# Patient Record
Sex: MALE | Race: WHITE | NOT HISPANIC OR LATINO | Employment: FULL TIME | URBAN - NONMETROPOLITAN AREA
[De-identification: names, ages, dates, MRNs, and addresses within clinical notes are randomized per-mention and may not be internally consistent; named-entity substitution may affect disease eponyms.]

---

## 2017-02-01 ENCOUNTER — OFFICE VISIT (OUTPATIENT)
Dept: PAIN MEDICINE | Facility: CLINIC | Age: 37
End: 2017-02-01

## 2017-02-01 ENCOUNTER — LAB (OUTPATIENT)
Dept: LAB | Facility: HOSPITAL | Age: 37
End: 2017-02-01

## 2017-02-01 VITALS
HEIGHT: 69 IN | DIASTOLIC BLOOD PRESSURE: 74 MMHG | WEIGHT: 315 LBS | BODY MASS INDEX: 46.65 KG/M2 | SYSTOLIC BLOOD PRESSURE: 122 MMHG

## 2017-02-01 DIAGNOSIS — Z79.899 HIGH RISK MEDICATIONS (NOT ANTICOAGULANTS) LONG-TERM USE: ICD-10-CM

## 2017-02-01 DIAGNOSIS — E66.01 MORBID OBESITY DUE TO EXCESS CALORIES (HCC): ICD-10-CM

## 2017-02-01 DIAGNOSIS — M47.817 LUMBOSACRAL SPONDYLOSIS WITHOUT MYELOPATHY: Primary | ICD-10-CM

## 2017-02-01 DIAGNOSIS — Z79.899 ENCOUNTER FOR LONG-TERM (CURRENT) USE OF HIGH-RISK MEDICATION: ICD-10-CM

## 2017-02-01 PROCEDURE — 82570 ASSAY OF URINE CREATININE: CPT | Performed by: PAIN MEDICINE

## 2017-02-01 PROCEDURE — 80307 DRUG TEST PRSMV CHEM ANLYZR: CPT | Performed by: PAIN MEDICINE

## 2017-02-01 PROCEDURE — G0481 DRUG TEST DEF 8-14 CLASSES: HCPCS | Performed by: PAIN MEDICINE

## 2017-02-01 PROCEDURE — 99214 OFFICE O/P EST MOD 30 MIN: CPT | Performed by: PAIN MEDICINE

## 2017-02-01 RX ORDER — FUROSEMIDE 40 MG/1
40 TABLET ORAL
COMMUNITY
Start: 2016-07-26 | End: 2019-01-30 | Stop reason: SDUPTHER

## 2017-02-01 RX ORDER — CYCLOBENZAPRINE HCL 10 MG
10 TABLET ORAL 3 TIMES DAILY
COMMUNITY
Start: 2016-07-26 | End: 2019-01-30 | Stop reason: SDUPTHER

## 2017-02-01 RX ORDER — LANCETS 30 GAUGE
1 EACH MISCELLANEOUS
COMMUNITY
Start: 2016-04-26 | End: 2019-01-30 | Stop reason: SDUPTHER

## 2017-02-01 RX ORDER — BLOOD-GLUCOSE METER
KIT MISCELLANEOUS
COMMUNITY
Start: 2016-12-31 | End: 2019-01-30 | Stop reason: SDUPTHER

## 2017-02-01 RX ORDER — GABAPENTIN 300 MG/1
300 CAPSULE ORAL
COMMUNITY
Start: 2016-11-30 | End: 2017-09-11 | Stop reason: DRUGHIGH

## 2017-02-01 RX ORDER — GLYBURIDE 5 MG/1
5 TABLET ORAL
COMMUNITY
Start: 2016-01-13 | End: 2019-01-30 | Stop reason: SDUPTHER

## 2017-02-01 RX ORDER — LISINOPRIL 20 MG/1
20 TABLET ORAL
COMMUNITY
Start: 2017-01-30 | End: 2019-01-30 | Stop reason: SDUPTHER

## 2017-02-01 RX ORDER — HYDROCODONE BITARTRATE AND ACETAMINOPHEN 7.5; 325 MG/1; MG/1
TABLET ORAL
COMMUNITY
Start: 2017-01-07 | End: 2019-01-30 | Stop reason: SDUPTHER

## 2017-02-01 RX ORDER — HYDROCODONE BITARTRATE AND ACETAMINOPHEN 7.5; 325 MG/1; MG/1
1 TABLET ORAL 3 TIMES DAILY
Qty: 90 TABLET | Refills: 0 | Status: SHIPPED | OUTPATIENT
Start: 2017-02-01 | End: 2017-03-03

## 2017-02-01 RX ORDER — LANCETS 30 GAUGE
EACH MISCELLANEOUS
COMMUNITY
Start: 2016-12-31 | End: 2019-01-30 | Stop reason: SDUPTHER

## 2017-02-01 NOTE — PROGRESS NOTES
Peter Abbott is a 36 y.o. male.   1980    HPI:   Location: lower back,left thigh, and left buttock  Quality: aching, sharp and left leg numbness  Severity: 6/10  Timing: constant  Alleviating: positioning  Aggravating: sitting      Has not gained any further wt.  Still losing slowly.  Down 3# since last visit.  Down from about 450# since he has been here.      The following portions of the patient's history were reviewed by me and updated as appropriate: allergies, current medications, past family history, past medical history, past social history, past surgical history and problem list.    Past Medical History   Diagnosis Date   • Arthropathy of lumbar facet joint    • Long term drug therapy    • Morbid obesity    • Tobacco user    • Umbilical hernia        Social History     Social History   • Marital status: Single     Spouse name: N/A   • Number of children: N/A   • Years of education: N/A     Occupational History   • Not on file.     Social History Main Topics   • Smoking status: Former Smoker   • Smokeless tobacco: Current User     Types: Chew   • Alcohol use Yes      Comment: occasionally   • Drug use: No   • Sexual activity: Defer     Other Topics Concern   • Not on file     Social History Narrative       Family History   Problem Relation Age of Onset   • Cancer Other    • Diabetes Other    • Hypertension Other          Current Outpatient Prescriptions:   •  cyclobenzaprine (FLEXERIL) 10 MG tablet, Take 10 mg by mouth 3 (Three) Times a Day., Disp: , Rfl:   •  diclofenac (VOLTAREN) 75 MG EC tablet, Take 75 mg by mouth daily as needed., Disp: , Rfl:   •  EASY TOUCH LANCETS 30G/TWIST misc, , Disp: , Rfl:   •  FREESTYLE LITE test strip, , Disp: , Rfl:   •  furosemide (LASIX) 40 MG tablet, Take 40 mg by mouth., Disp: , Rfl:   •  gabapentin (NEURONTIN) 300 MG capsule, Take 300 mg by mouth., Disp: , Rfl:   •  glucose blood test strip, To use qhs qith lantus, Disp: , Rfl:   •  HYDROcodone-acetaminophen (NORCO)  7.5-325 MG per tablet, , Disp: , Rfl:   •  Insulin Glargine (LANTUS SOLOSTAR) 100 UNIT/ML injection pen, Inject 10 Units under the skin., Disp: , Rfl:   •  Lancets misc, 1 Device., Disp: , Rfl:   •  lisinopril (PRINIVIL,ZESTRIL) 20 MG tablet, Take 20 mg by mouth., Disp: , Rfl:   •  metFORMIN (GLUCOPHAGE) 1000 MG tablet, 1,000 mg., Disp: , Rfl:   •  SITagliptin (JANUVIA) 100 MG tablet, Take 100 mg by mouth., Disp: , Rfl:   •  cyclobenzaprine (FLEXERIL) 10 MG tablet, Take 10 mg by mouth 3 (three) times a day., Disp: , Rfl:   •  furosemide (LASIX) 40 MG tablet, Take 40 mg by mouth., Disp: , Rfl:   •  gabapentin (NEURONTIN) 300 MG capsule, Take 300 mg by mouth 3 (three) times a day., Disp: , Rfl:   •  glucose blood test strip, Test tid, Disp: , Rfl:   •  glyBURIDE (DIABETA) 5 MG tablet, Take 5 mg by mouth daily., Disp: , Rfl:   •  glyBURIDE (DIAbeta) 5 MG tablet, Take 5 mg by mouth., Disp: , Rfl:   •  HYDROcodone-acetaminophen (NORCO) 7.5-325 MG per tablet, Take 1 tablet by mouth 3 (Three) Times a Day for 30 days., Disp: 90 tablet, Rfl: 0  •  HYDROcodone-acetaminophen (NORCO) 7.5-325 MG per tablet, Take 1 tablet by mouth 3 (Three) Times a Day for 30 days., Disp: 90 tablet, Rfl: 0  •  lisinopril (PRINIVIL,ZESTRIL) 20 MG tablet, Take 20 mg by mouth daily., Disp: , Rfl:   •  metFORMIN (GLUCOPHAGE) 850 MG tablet, Take 850 mg by mouth 2 (two) times a day., Disp: , Rfl:   •  morphine (MS CONTIN) 15 MG 12 hr tablet, Take 15 mg by mouth every 12 (twelve) hours., Disp: , Rfl:     No Known Allergies      Review of Systems   Musculoskeletal: Positive for back pain (lower).        Left thigh and left buttock     10 system review of systems was reviewed and negative except for above.    Physical Exam   Constitutional: He appears well-developed and well-nourished. No distress.   Abdominal:   obese   Musculoskeletal:        Lumbar back: He exhibits decreased range of motion (10 deg ext with facet loading.  45 deg flexion).    Neurological: He is alert.   Psychiatric: He has a normal mood and affect. His behavior is normal. Judgment normal.       Peter was seen today for back pain and lower extremity issue.    Diagnoses and all orders for this visit:    Lumbosacral spondylosis without myelopathy  -     ToxASSURE Select 13 (MW)    High risk medications (not anticoagulants) long-term use  -     ToxASSURE Select 13 (MW)    Morbid obesity due to excess calories  -     ToxASSURE Select 13 (MW)    Other orders  -     HYDROcodone-acetaminophen (NORCO) 7.5-325 MG per tablet; Take 1 tablet by mouth 3 (Three) Times a Day for 30 days.  -     HYDROcodone-acetaminophen (NORCO) 7.5-325 MG per tablet; Take 1 tablet by mouth 3 (Three) Times a Day for 30 days.        Medication: Patient reports no negative side effects, Patient reports appropriate usage and storage habits, Patient's opioid provides enough reflief to be more active and perform activities of daily living with less discomfort. and Refill opioid medication as above    Interventional: none at this time    Rehab: none at this time    Behavioral: No aberrant behavior noted. [facility] Report #[id number]  was reviewed and is consistent with stated history    Urine drug screen Ordered today to test for drugs of abuse and prescribed medications          This document has been electronically signed by José Manuel Barcenas MD on February 1, 2017 3:51 PM

## 2017-02-06 LAB
CONV REPORT SUMMARY: NORMAL
Lab: NORMAL

## 2017-03-28 ENCOUNTER — OFFICE VISIT (OUTPATIENT)
Dept: PAIN MEDICINE | Facility: CLINIC | Age: 37
End: 2017-03-28

## 2017-03-28 VITALS
BODY MASS INDEX: 46.65 KG/M2 | DIASTOLIC BLOOD PRESSURE: 80 MMHG | HEIGHT: 69 IN | SYSTOLIC BLOOD PRESSURE: 122 MMHG | WEIGHT: 315 LBS

## 2017-03-28 DIAGNOSIS — M47.817 LUMBOSACRAL SPONDYLOSIS WITHOUT MYELOPATHY: Primary | ICD-10-CM

## 2017-03-28 DIAGNOSIS — E66.01 MORBID OBESITY DUE TO EXCESS CALORIES (HCC): ICD-10-CM

## 2017-03-28 DIAGNOSIS — Z79.899 HIGH RISK MEDICATIONS (NOT ANTICOAGULANTS) LONG-TERM USE: ICD-10-CM

## 2017-03-28 PROCEDURE — 99214 OFFICE O/P EST MOD 30 MIN: CPT | Performed by: PAIN MEDICINE

## 2017-03-28 RX ORDER — HYDROCODONE BITARTRATE AND ACETAMINOPHEN 7.5; 325 MG/1; MG/1
1 TABLET ORAL 3 TIMES DAILY
Qty: 90 TABLET | Refills: 0 | Status: SHIPPED | OUTPATIENT
Start: 2017-03-28 | End: 2017-04-27

## 2017-03-28 RX ORDER — CYCLOBENZAPRINE HCL 10 MG
10 TABLET ORAL 3 TIMES DAILY
COMMUNITY
Start: 2016-07-26 | End: 2019-01-30 | Stop reason: SDUPTHER

## 2017-03-28 RX ORDER — GABAPENTIN 300 MG/1
300 CAPSULE ORAL
COMMUNITY
Start: 2016-11-30 | End: 2017-09-11 | Stop reason: DRUGHIGH

## 2017-03-28 RX ORDER — HYDROCODONE BITARTRATE AND ACETAMINOPHEN 7.5; 325 MG/1; MG/1
1 TABLET ORAL EVERY 8 HOURS PRN
COMMUNITY
Start: 2017-01-07

## 2017-03-28 RX ORDER — LANCETS 28 GAUGE
1 EACH MISCELLANEOUS
COMMUNITY
Start: 2016-04-26 | End: 2019-01-30 | Stop reason: SDUPTHER

## 2017-03-28 RX ORDER — GLYBURIDE 5 MG/1
5 TABLET ORAL 2 TIMES DAILY WITH MEALS
COMMUNITY
Start: 2017-02-08

## 2017-03-28 RX ORDER — FUROSEMIDE 40 MG/1
40 TABLET ORAL
COMMUNITY
Start: 2016-07-26 | End: 2019-01-30 | Stop reason: SDUPTHER

## 2017-03-28 RX ORDER — LISINOPRIL 20 MG/1
20 TABLET ORAL DAILY
COMMUNITY
Start: 2017-01-30

## 2017-03-28 NOTE — PROGRESS NOTES
Peter Abbott is a 37 y.o. male.   1980    HPI:   Location: lower back and left thigh and buttocks  Quality: aching, sharp and numbness  Severity: 5/10  Timing: constant  Alleviating: positioning  Aggravating: sitting     Patient reports that the opioid medication still provides him good relief and allows increased activity than he would have without the opioid medication. He denies side effects.  Pt has lost 8# and has started a new job.      The following portions of the patient's history were reviewed by me and updated as appropriate: allergies, current medications, past family history, past medical history, past social history, past surgical history and problem list.    Past Medical History:   Diagnosis Date   • Arthropathy of lumbar facet joint    • Long term drug therapy    • Morbid obesity    • Tobacco user    • Umbilical hernia        Social History     Social History   • Marital status: Single     Spouse name: N/A   • Number of children: N/A   • Years of education: N/A     Occupational History   • Not on file.     Social History Main Topics   • Smoking status: Former Smoker   • Smokeless tobacco: Current User     Types: Chew   • Alcohol use Yes      Comment: occasionally   • Drug use: No   • Sexual activity: Defer     Other Topics Concern   • Not on file     Social History Narrative       Family History   Problem Relation Age of Onset   • Cancer Other    • Diabetes Other    • Hypertension Other          Current Outpatient Prescriptions:   •  cyclobenzaprine (FLEXERIL) 10 MG tablet, Take 10 mg by mouth 3 (three) times a day., Disp: , Rfl:   •  EASY TOUCH LANCETS 30G/TWIST misc, , Disp: , Rfl:   •  FREESTYLE LITE test strip, , Disp: , Rfl:   •  furosemide (LASIX) 40 MG tablet, Take 40 mg by mouth., Disp: , Rfl:   •  gabapentin (NEURONTIN) 300 MG capsule, Take 300 mg by mouth 3 (three) times a day., Disp: , Rfl:   •  glucose blood test strip, Test tid, Disp: , Rfl:   •  glyBURIDE (DIABETA) 5 MG tablet,  Take 5 mg by mouth daily., Disp: , Rfl:   •  HYDROcodone-acetaminophen (NORCO) 7.5-325 MG per tablet, , Disp: , Rfl:   •  Insulin Glargine (LANTUS SOLOSTAR) 100 UNIT/ML injection pen, Inject 10 Units under the skin., Disp: , Rfl:   •  Lancets (FREESTYLE) lancets, 1 Device., Disp: , Rfl:   •  Lancets misc, 1 Device., Disp: , Rfl:   •  lisinopril (PRINIVIL,ZESTRIL) 20 MG tablet, Take 20 mg by mouth daily., Disp: , Rfl:   •  lisinopril (PRINIVIL,ZESTRIL) 20 MG tablet, Take 20 mg by mouth., Disp: , Rfl:   •  metFORMIN (GLUCOPHAGE) 1000 MG tablet, 1,000 mg., Disp: , Rfl:   •  morphine (MS CONTIN) 15 MG 12 hr tablet, Take 15 mg by mouth every 12 (twelve) hours., Disp: , Rfl:   •  SITagliptin (JANUVIA) 100 MG tablet, Take 100 mg by mouth., Disp: , Rfl:   •  cyclobenzaprine (FLEXERIL) 10 MG tablet, Take 10 mg by mouth 3 (Three) Times a Day., Disp: , Rfl:   •  cyclobenzaprine (FLEXERIL) 10 MG tablet, Take 10 mg by mouth 3 (Three) Times a Day., Disp: , Rfl:   •  diclofenac (VOLTAREN) 75 MG EC tablet, Take 75 mg by mouth daily as needed., Disp: , Rfl:   •  furosemide (LASIX) 40 MG tablet, Take 40 mg by mouth., Disp: , Rfl:   •  furosemide (LASIX) 40 MG tablet, Take 40 mg by mouth., Disp: , Rfl:   •  gabapentin (NEURONTIN) 300 MG capsule, Take 300 mg by mouth., Disp: , Rfl:   •  gabapentin (NEURONTIN) 300 MG capsule, Take 300 mg by mouth., Disp: , Rfl:   •  glucose blood test strip, To use qhs qith lantus, Disp: , Rfl:   •  glucose blood test strip, Test tid, Disp: , Rfl:   •  glyBURIDE (DIAbeta) 5 MG tablet, Take 5 mg by mouth., Disp: , Rfl:   •  glyBURIDE (DIAbeta) 5 MG tablet, Take 5 mg by mouth., Disp: , Rfl:   •  HYDROcodone-acetaminophen (NORCO) 7.5-325 MG per tablet, , Disp: , Rfl:   •  HYDROcodone-acetaminophen (NORCO) 7.5-325 MG per tablet, Take 1 tablet by mouth 3 (Three) Times a Day for 30 days., Disp: 90 tablet, Rfl: 0  •  HYDROcodone-acetaminophen (NORCO) 7.5-325 MG per tablet, Take 1 tablet by mouth 3 (Three)  Times a Day for 30 days., Disp: 90 tablet, Rfl: 0  •  Insulin Glargine (LANTUS SOLOSTAR) 100 UNIT/ML injection pen, Inject 10 Units under the skin., Disp: , Rfl:   •  lisinopril (PRINIVIL,ZESTRIL) 20 MG tablet, Take 20 mg by mouth., Disp: , Rfl:   •  metFORMIN (GLUCOPHAGE) 850 MG tablet, Take 850 mg by mouth 2 (two) times a day., Disp: , Rfl:   •  SITagliptin (JANUVIA) 100 MG tablet, Take 100 mg by mouth., Disp: , Rfl:     No Known Allergies      Review of Systems   Musculoskeletal: Positive for back pain (lower).        Left thigh and left buttocks pain     10 system review of systems was reviewed and negative except for above.    Physical Exam   Constitutional: He appears well-developed and well-nourished. No distress.   Abdominal:   obese   Musculoskeletal:        Lumbar back: He exhibits decreased range of motion (45 deg flexion and 15 deg ext.).   Neurological: He is alert.   Psychiatric: He has a normal mood and affect. His behavior is normal. Judgment normal.       Peter was seen today for back pain, lower extremity issue and rectal pain.    Diagnoses and all orders for this visit:    Lumbosacral spondylosis without myelopathy    High risk medications (not anticoagulants) long-term use    Morbid obesity due to excess calories    Other orders  -     HYDROcodone-acetaminophen (NORCO) 7.5-325 MG per tablet; Take 1 tablet by mouth 3 (Three) Times a Day for 30 days.  -     HYDROcodone-acetaminophen (NORCO) 7.5-325 MG per tablet; Take 1 tablet by mouth 3 (Three) Times a Day for 30 days.        Medication: Patient reports no negative side effects, Patient reports appropriate usage and storage habits, Patient's opioid provides enough reflief to be more active and perform activities of daily living with less discomfort. and Refill opioid medication as above    Interventional: none at this time    Rehab: none at this time.  Continue focus on wt loss.     Behavioral: No aberrant behavior noted. [facility] Report #[id number]   was reviewed and is consistent with stated history    Urine drug screen Reviewed from last visit and is appropriate          This document has been electronically signed by José Manuel Barcenas MD on March 28, 2017 3:12 PM

## 2017-05-24 ENCOUNTER — OFFICE VISIT (OUTPATIENT)
Dept: PAIN MEDICINE | Facility: CLINIC | Age: 37
End: 2017-05-24

## 2017-05-24 VITALS
DIASTOLIC BLOOD PRESSURE: 84 MMHG | HEIGHT: 69 IN | SYSTOLIC BLOOD PRESSURE: 122 MMHG | BODY MASS INDEX: 46.65 KG/M2 | WEIGHT: 315 LBS

## 2017-05-24 DIAGNOSIS — E66.01 MORBID (SEVERE) OBESITY DUE TO EXCESS CALORIES (HCC): ICD-10-CM

## 2017-05-24 DIAGNOSIS — Z79.899 HIGH RISK MEDICATIONS (NOT ANTICOAGULANTS) LONG-TERM USE: ICD-10-CM

## 2017-05-24 DIAGNOSIS — M79.18 MYOFACIAL MUSCLE PAIN: ICD-10-CM

## 2017-05-24 DIAGNOSIS — M47.817 LUMBOSACRAL SPONDYLOSIS WITHOUT MYELOPATHY: Primary | ICD-10-CM

## 2017-05-24 PROCEDURE — 99213 OFFICE O/P EST LOW 20 MIN: CPT | Performed by: NURSE PRACTITIONER

## 2017-07-24 ENCOUNTER — OFFICE VISIT (OUTPATIENT)
Dept: PAIN MEDICINE | Facility: CLINIC | Age: 37
End: 2017-07-24

## 2017-07-24 VITALS
DIASTOLIC BLOOD PRESSURE: 70 MMHG | SYSTOLIC BLOOD PRESSURE: 130 MMHG | HEIGHT: 69 IN | WEIGHT: 315 LBS | BODY MASS INDEX: 46.65 KG/M2

## 2017-07-24 DIAGNOSIS — M47.817 LUMBOSACRAL SPONDYLOSIS WITHOUT MYELOPATHY: Primary | ICD-10-CM

## 2017-07-24 DIAGNOSIS — E66.01 MORBID (SEVERE) OBESITY DUE TO EXCESS CALORIES (HCC): ICD-10-CM

## 2017-07-24 DIAGNOSIS — M79.18 MYOFACIAL MUSCLE PAIN: ICD-10-CM

## 2017-07-24 DIAGNOSIS — Z79.899 HIGH RISK MEDICATIONS (NOT ANTICOAGULANTS) LONG-TERM USE: ICD-10-CM

## 2017-07-24 PROCEDURE — 99213 OFFICE O/P EST LOW 20 MIN: CPT | Performed by: PAIN MEDICINE

## 2017-07-24 NOTE — PROGRESS NOTES
Peter Abbott is a 37 y.o. male.   1980    HPI:   Location: lower back and left thigh  Quality: aching, sharp and numbness  Severity: 5/10  Timing: constant  Alleviating: positioning  Aggravating: sitting     Pt reports 7 lb weight loss, and remains working 3rd shift at [employer]. Opiate medications provides enough relief for daily activity and ambulation. NO SE. Pt happy with pain management visit and regimen.         The following portions of the patient's history were reviewed by me and updated as appropriate: allergies, current medications, past family history, past medical history, past social history, past surgical history and problem list.    Past Medical History:   Diagnosis Date   • Arthropathy of lumbar facet joint    • Chronic pain disorder    • Long term drug therapy    • Low back pain    • Morbid obesity    • Tobacco user    • Umbilical hernia        Social History     Social History   • Marital status: Single     Spouse name: N/A   • Number of children: N/A   • Years of education: N/A     Occupational History   • Not on file.     Social History Main Topics   • Smoking status: Former Smoker   • Smokeless tobacco: Current User     Types: Chew   • Alcohol use Yes      Comment: occasionally   • Drug use: No   • Sexual activity: Defer     Other Topics Concern   • Not on file     Social History Narrative       Family History   Problem Relation Age of Onset   • Cancer Other    • Diabetes Other    • Hypertension Other          Current Outpatient Prescriptions:   •  cyclobenzaprine (FLEXERIL) 10 MG tablet, Take 10 mg by mouth 3 (three) times a day., Disp: , Rfl:   •  cyclobenzaprine (FLEXERIL) 10 MG tablet, Take 10 mg by mouth 3 (Three) Times a Day., Disp: , Rfl:   •  cyclobenzaprine (FLEXERIL) 10 MG tablet, Take 10 mg by mouth 3 (Three) Times a Day., Disp: , Rfl:   •  diclofenac (VOLTAREN) 75 MG EC tablet, Take 75 mg by mouth daily as needed., Disp: , Rfl:   •  EASY TOUCH LANCETS 30G/TWIST misc, , Disp: ,  Rfl:   •  FREESTYLE LITE test strip, , Disp: , Rfl:   •  furosemide (LASIX) 40 MG tablet, Take 40 mg by mouth., Disp: , Rfl:   •  furosemide (LASIX) 40 MG tablet, Take 40 mg by mouth., Disp: , Rfl:   •  furosemide (LASIX) 40 MG tablet, Take 40 mg by mouth., Disp: , Rfl:   •  gabapentin (NEURONTIN) 300 MG capsule, Take 300 mg by mouth 3 (three) times a day., Disp: , Rfl:   •  gabapentin (NEURONTIN) 300 MG capsule, Take 300 mg by mouth., Disp: , Rfl:   •  gabapentin (NEURONTIN) 300 MG capsule, Take 300 mg by mouth., Disp: , Rfl:   •  glucose blood test strip, Test tid, Disp: , Rfl:   •  glucose blood test strip, To use qhs qith lantus, Disp: , Rfl:   •  glucose blood test strip, Test tid, Disp: , Rfl:   •  glyBURIDE (DIABETA) 5 MG tablet, Take 5 mg by mouth daily., Disp: , Rfl:   •  glyBURIDE (DIAbeta) 5 MG tablet, Take 5 mg by mouth., Disp: , Rfl:   •  glyBURIDE (DIAbeta) 5 MG tablet, Take 5 mg by mouth., Disp: , Rfl:   •  HYDROcodone-acetaminophen (NORCO) 7.5-325 MG per tablet, , Disp: , Rfl:   •  HYDROcodone-acetaminophen (NORCO) 7.5-325 MG per tablet, , Disp: , Rfl:   •  Insulin Glargine (LANTUS SOLOSTAR) 100 UNIT/ML injection pen, Inject 10 Units under the skin., Disp: , Rfl:   •  Insulin Glargine (LANTUS SOLOSTAR) 100 UNIT/ML injection pen, Inject 10 Units under the skin., Disp: , Rfl:   •  Lancets (FREESTYLE) lancets, 1 Device., Disp: , Rfl:   •  Lancets misc, 1 Device., Disp: , Rfl:   •  lisinopril (PRINIVIL,ZESTRIL) 20 MG tablet, Take 20 mg by mouth daily., Disp: , Rfl:   •  lisinopril (PRINIVIL,ZESTRIL) 20 MG tablet, Take 20 mg by mouth., Disp: , Rfl:   •  lisinopril (PRINIVIL,ZESTRIL) 20 MG tablet, Take 20 mg by mouth., Disp: , Rfl:   •  metFORMIN (GLUCOPHAGE) 1000 MG tablet, 1,000 mg., Disp: , Rfl:   •  metFORMIN (GLUCOPHAGE) 850 MG tablet, Take 850 mg by mouth 2 (two) times a day., Disp: , Rfl:   •  morphine (MS CONTIN) 15 MG 12 hr tablet, Take 15 mg by mouth every 12 (twelve) hours., Disp: , Rfl:   •   SITagliptin (JANUVIA) 100 MG tablet, Take 100 mg by mouth., Disp: , Rfl:   •  SITagliptin (JANUVIA) 100 MG tablet, Take 100 mg by mouth., Disp: , Rfl:     No Known Allergies    Review of Systems   Musculoskeletal: Positive for back pain.        L.thigh an buttocks     All other systems reviewed and are negative.    All systems reviewed and negative except for above.    Physical Exam   Constitutional: He is oriented to person, place, and time. He appears well-developed and well-nourished. No distress.   Abdominal:   obese   Musculoskeletal:        Lumbar back: He exhibits decreased range of motion (Flex 50 deg and <10 deg ext with FL alyssa  ) and tenderness ( midline and right SI joint tenderness ).   Neurological: He is alert and oriented to person, place, and time. He displays no tremor. He exhibits normal muscle tone. He displays no seizure activity. Coordination and gait normal.   Reflex Scores:       Tricep reflexes are 2+ on the right side and 2+ on the left side.       Bicep reflexes are 2+ on the right side and 2+ on the left side.       Brachioradialis reflexes are 2+ on the right side and 2+ on the left side.       Patellar reflexes are 1+ on the right side and 1+ on the left side.       Achilles reflexes are 1+ on the right side and 1+ on the left side.  Mild SLR bilateral   Skin: Skin is warm and dry. No rash noted. No pallor.   Psychiatric: He has a normal mood and affect. His behavior is normal. Judgment normal. He expresses no homicidal and no suicidal ideation. He expresses no suicidal plans and no homicidal plans.   Nursing note and vitals reviewed.      Peter was seen today for back pain and pain.    Diagnoses and all orders for this visit:    Lumbosacral spondylosis without myelopathy    Myofacial muscle pain    Morbid (severe) obesity due to excess calories    High risk medications (not anticoagulants) long-term use      Medication: Patient reports no negative side effects, Patient reports  appropriate usage and storage habits and Patient's opioid provides enough reflief to be more active and perform activities of daily living with less discomfort. Norco 7.5mg TID. Discussed case with Shawn Barcenas, opiate medication refilled ×2 hand written scripts.      Interventional:  I have discussed weight control and issues with current exam, current weight 358lb-- 7 lb weight loss from last visit. DARIO and any neurological impairments discussed with patient that may need of emergency evaluation.        Rehab: works 5 days a week.     Behavioral: No aberrant behavior noted. [facility] Report # [id number]  was reviewed and is consistent with stated history    Urine drug screen None at this time. UDS next times.           This document has been electronically signed by NATASHA Deutsch on July 24, 2017 10:35 AM          This document has been electronically signed by NATASHA Deutsch on July 24, 2017 10:35 AM

## 2017-09-11 ENCOUNTER — OFFICE VISIT (OUTPATIENT)
Dept: PAIN MEDICINE | Facility: CLINIC | Age: 37
End: 2017-09-11

## 2017-09-11 ENCOUNTER — APPOINTMENT (OUTPATIENT)
Dept: LAB | Facility: HOSPITAL | Age: 37
End: 2017-09-11

## 2017-09-11 VITALS
WEIGHT: 315 LBS | DIASTOLIC BLOOD PRESSURE: 80 MMHG | BODY MASS INDEX: 45.1 KG/M2 | HEIGHT: 70 IN | SYSTOLIC BLOOD PRESSURE: 122 MMHG

## 2017-09-11 DIAGNOSIS — M47.817 LUMBOSACRAL SPONDYLOSIS WITHOUT MYELOPATHY: Primary | ICD-10-CM

## 2017-09-11 DIAGNOSIS — M79.18 MYOFASCIAL PAIN: ICD-10-CM

## 2017-09-11 DIAGNOSIS — Z79.899 HIGH RISK MEDICATIONS (NOT ANTICOAGULANTS) LONG-TERM USE: ICD-10-CM

## 2017-09-11 DIAGNOSIS — E66.01 MORBID (SEVERE) OBESITY DUE TO EXCESS CALORIES (HCC): ICD-10-CM

## 2017-09-11 PROCEDURE — 80307 DRUG TEST PRSMV CHEM ANLYZR: CPT | Performed by: PAIN MEDICINE

## 2017-09-11 PROCEDURE — G0481 DRUG TEST DEF 8-14 CLASSES: HCPCS | Performed by: PAIN MEDICINE

## 2017-09-11 PROCEDURE — 99214 OFFICE O/P EST MOD 30 MIN: CPT | Performed by: PAIN MEDICINE

## 2017-09-11 RX ORDER — HYDROCODONE BITARTRATE AND ACETAMINOPHEN 7.5; 325 MG/1; MG/1
1 TABLET ORAL 3 TIMES DAILY
Qty: 90 TABLET | Refills: 0 | Status: SHIPPED | OUTPATIENT
Start: 2017-09-11 | End: 2017-10-11

## 2017-09-11 RX ORDER — GABAPENTIN 600 MG/1
600 TABLET ORAL
COMMUNITY
Start: 2017-08-02 | End: 2019-01-30 | Stop reason: ALTCHOICE

## 2017-09-11 NOTE — PROGRESS NOTES
Peter Abbott is a 37 y.o. male.   1980    HPI:   Location: lower back and left thigh  Quality: aching, sharp and numbness  Severity: 5/10  Timing: constant  Alleviating: positioning  Aggravating: sitting      Patient denies side effects, he reports that his opioid medication still provides significant relief and allows him to be more active.  Gained some weight.  Feels like he backslid on his diet and needs to work on this.  States he is very active at work.          The following portions of the patient's history were reviewed by me and updated as appropriate: allergies, current medications, past family history, past medical history, past social history, past surgical history and problem list.    Past Medical History:   Diagnosis Date   • Arthropathy of lumbar facet joint    • Chronic pain disorder    • Long term drug therapy    • Low back pain    • Morbid obesity    • Tobacco user    • Umbilical hernia        Social History     Social History   • Marital status: Single     Spouse name: N/A   • Number of children: N/A   • Years of education: N/A     Occupational History   • Not on file.     Social History Main Topics   • Smoking status: Former Smoker   • Smokeless tobacco: Current User     Types: Chew   • Alcohol use Yes      Comment: occasionally   • Drug use: No   • Sexual activity: Defer     Other Topics Concern   • Not on file     Social History Narrative       Family History   Problem Relation Age of Onset   • Cancer Other    • Diabetes Other    • Hypertension Other          Current Outpatient Prescriptions:   •  cyclobenzaprine (FLEXERIL) 10 MG tablet, Take 10 mg by mouth 3 (three) times a day., Disp: , Rfl:   •  cyclobenzaprine (FLEXERIL) 10 MG tablet, Take 10 mg by mouth 3 (Three) Times a Day., Disp: , Rfl:   •  cyclobenzaprine (FLEXERIL) 10 MG tablet, Take 10 mg by mouth 3 (Three) Times a Day., Disp: , Rfl:   •  diclofenac (VOLTAREN) 75 MG EC tablet, Take 75 mg by mouth daily as needed., Disp: , Rfl:    •  EASY TOUCH LANCETS 30G/TWIST misc, , Disp: , Rfl:   •  FREESTYLE LITE test strip, , Disp: , Rfl:   •  furosemide (LASIX) 40 MG tablet, Take 40 mg by mouth., Disp: , Rfl:   •  furosemide (LASIX) 40 MG tablet, Take 40 mg by mouth., Disp: , Rfl:   •  furosemide (LASIX) 40 MG tablet, Take 40 mg by mouth., Disp: , Rfl:   •  gabapentin (NEURONTIN) 600 MG tablet, Take 600 mg by mouth., Disp: , Rfl:   •  glucose blood test strip, Test tid, Disp: , Rfl:   •  glucose blood test strip, To use qhs qith lantus, Disp: , Rfl:   •  glucose blood test strip, Test tid, Disp: , Rfl:   •  glyBURIDE (DIABETA) 5 MG tablet, Take 5 mg by mouth daily., Disp: , Rfl:   •  glyBURIDE (DIAbeta) 5 MG tablet, Take 5 mg by mouth., Disp: , Rfl:   •  glyBURIDE (DIAbeta) 5 MG tablet, Take 5 mg by mouth., Disp: , Rfl:   •  HYDROcodone-acetaminophen (NORCO) 7.5-325 MG per tablet, , Disp: , Rfl:   •  HYDROcodone-acetaminophen (NORCO) 7.5-325 MG per tablet, , Disp: , Rfl:   •  Insulin Glargine (LANTUS SOLOSTAR) 100 UNIT/ML injection pen, Inject 10 Units under the skin., Disp: , Rfl:   •  Insulin Glargine (LANTUS SOLOSTAR) 100 UNIT/ML injection pen, Inject 10 Units under the skin., Disp: , Rfl:   •  Lancets (FREESTYLE) lancets, 1 Device., Disp: , Rfl:   •  Lancets misc, 1 Device., Disp: , Rfl:   •  lisinopril (PRINIVIL,ZESTRIL) 20 MG tablet, Take 20 mg by mouth daily., Disp: , Rfl:   •  lisinopril (PRINIVIL,ZESTRIL) 20 MG tablet, Take 20 mg by mouth., Disp: , Rfl:   •  lisinopril (PRINIVIL,ZESTRIL) 20 MG tablet, Take 20 mg by mouth., Disp: , Rfl:   •  metFORMIN (GLUCOPHAGE) 1000 MG tablet, 1,000 mg., Disp: , Rfl:   •  metFORMIN (GLUCOPHAGE) 850 MG tablet, Take 850 mg by mouth 2 (two) times a day., Disp: , Rfl:   •  morphine (MS CONTIN) 15 MG 12 hr tablet, Take 15 mg by mouth every 12 (twelve) hours., Disp: , Rfl:   •  SITagliptin (JANUVIA) 100 MG tablet, Take 100 mg by mouth., Disp: , Rfl:   •  SITagliptin (JANUVIA) 100 MG tablet, Take 100 mg by  mouth., Disp: , Rfl:   •  HYDROcodone-acetaminophen (NORCO) 7.5-325 MG per tablet, Take 1 tablet by mouth 3 (Three) Times a Day for 30 days., Disp: 90 tablet, Rfl: 0  •  HYDROcodone-acetaminophen (NORCO) 7.5-325 MG per tablet, Take 1 tablet by mouth 3 (Three) Times a Day for 30 days., Disp: 90 tablet, Rfl: 0    No Known Allergies    Review of Systems   Musculoskeletal: Positive for back pain (lower).        Left thigh pain   All other systems reviewed and are negative.    All systems reviewed and negative except for above.    Physical Exam   Constitutional: He appears well-developed and well-nourished. No distress.   Abdominal:   obese   Musculoskeletal:        Lumbar back: He exhibits decreased range of motion (45 deg flexion and 5 deg ext.).   Neurological: He is alert.   Psychiatric: He has a normal mood and affect. His behavior is normal. Judgment normal.       Peter was seen today for back pain and pain.    Diagnoses and all orders for this visit:    Lumbosacral spondylosis without myelopathy  -     ToxASSURE Select 13 (MW)    Myofascial pain  -     ToxASSURE Select 13 (MW)    Morbid (severe) obesity due to excess calories  -     ToxASSURE Select 13 (MW)    High risk medications (not anticoagulants) long-term use  -     ToxASSURE Select 13 (MW)    Other orders  -     HYDROcodone-acetaminophen (NORCO) 7.5-325 MG per tablet; Take 1 tablet by mouth 3 (Three) Times a Day for 30 days.  -     HYDROcodone-acetaminophen (NORCO) 7.5-325 MG per tablet; Take 1 tablet by mouth 3 (Three) Times a Day for 30 days.        Medication: Patient reports no negative side effects, Patient reports appropriate usage and storage habits, Patient's opioid provides enough reflief to be more active and perform activities of daily living with less discomfort. and Refill opioid medication as above .    Interventional: none at this time    Rehab: none at this time.  Encouraged focus on exercise and wt loss.     Behavioral: No aberrant  behavior noted. [facility] Report #[id number]  was reviewed and is consistent with stated history    Urine drug screen Ordered today to test for drugs of abuse and prescribed medications          This document has been electronically signed by José Manuel Barcenas MD on September 11, 2017 11:06 AM

## 2017-09-16 LAB — CONV REPORT SUMMARY: NORMAL

## 2019-01-30 ENCOUNTER — CONSULT (OUTPATIENT)
Dept: SURGERY | Facility: CLINIC | Age: 39
End: 2019-01-30

## 2019-01-30 ENCOUNTER — APPOINTMENT (OUTPATIENT)
Dept: PREADMISSION TESTING | Facility: HOSPITAL | Age: 39
End: 2019-01-30

## 2019-01-30 ENCOUNTER — ANESTHESIA EVENT (OUTPATIENT)
Dept: PERIOP | Facility: HOSPITAL | Age: 39
End: 2019-01-30

## 2019-01-30 VITALS
SYSTOLIC BLOOD PRESSURE: 138 MMHG | TEMPERATURE: 97.4 F | BODY MASS INDEX: 45.1 KG/M2 | HEIGHT: 70 IN | HEART RATE: 112 BPM | DIASTOLIC BLOOD PRESSURE: 88 MMHG | WEIGHT: 315 LBS

## 2019-01-30 VITALS
DIASTOLIC BLOOD PRESSURE: 80 MMHG | SYSTOLIC BLOOD PRESSURE: 120 MMHG | HEART RATE: 117 BPM | BODY MASS INDEX: 46.65 KG/M2 | HEIGHT: 69 IN | WEIGHT: 315 LBS | RESPIRATION RATE: 18 BRPM | OXYGEN SATURATION: 97 %

## 2019-01-30 DIAGNOSIS — L08.9 SOFT TISSUE INFECTION: Primary | ICD-10-CM

## 2019-01-30 LAB
ANION GAP SERPL CALCULATED.3IONS-SCNC: 12 MMOL/L (ref 5–15)
BUN BLD-MCNC: 17 MG/DL (ref 7–21)
BUN/CREAT SERPL: 16.8 (ref 7–25)
CALCIUM SPEC-SCNC: 10.1 MG/DL (ref 8.4–10.2)
CHLORIDE SERPL-SCNC: 95 MMOL/L (ref 95–110)
CO2 SERPL-SCNC: 24 MMOL/L (ref 22–31)
CREAT BLD-MCNC: 1.01 MG/DL (ref 0.7–1.3)
GFR SERPL CREATININE-BSD FRML MDRD: 82 ML/MIN/1.73 (ref 70–162)
GLUCOSE BLD-MCNC: 256 MG/DL (ref 60–100)
POTASSIUM BLD-SCNC: 4.9 MMOL/L (ref 3.5–5.1)
SODIUM BLD-SCNC: 131 MMOL/L (ref 137–145)

## 2019-01-30 PROCEDURE — 93010 ELECTROCARDIOGRAM REPORT: CPT | Performed by: INTERNAL MEDICINE

## 2019-01-30 PROCEDURE — 99204 OFFICE O/P NEW MOD 45 MIN: CPT | Performed by: SURGERY

## 2019-01-30 PROCEDURE — 80048 BASIC METABOLIC PNL TOTAL CA: CPT | Performed by: ANESTHESIOLOGY

## 2019-01-30 PROCEDURE — 93005 ELECTROCARDIOGRAM TRACING: CPT

## 2019-01-30 PROCEDURE — 36415 COLL VENOUS BLD VENIPUNCTURE: CPT

## 2019-01-30 RX ORDER — CYCLOBENZAPRINE HCL 10 MG
10 TABLET ORAL 3 TIMES DAILY PRN
COMMUNITY
Start: 2019-01-23

## 2019-01-30 RX ORDER — INSULIN GLARGINE 100 [IU]/ML
15 INJECTION, SOLUTION SUBCUTANEOUS NIGHTLY
COMMUNITY

## 2019-01-30 RX ORDER — CLOBETASOL PROPIONATE 0.5 MG/G
CREAM TOPICAL 2 TIMES DAILY
COMMUNITY
Start: 2019-01-10

## 2019-01-30 RX ORDER — SODIUM CHLORIDE 9 MG/ML
1000 INJECTION, SOLUTION INTRAVENOUS CONTINUOUS
Status: CANCELLED | OUTPATIENT
Start: 2019-01-31

## 2019-01-30 RX ORDER — AMITRIPTYLINE HYDROCHLORIDE 10 MG/1
10 TABLET, FILM COATED ORAL NIGHTLY
COMMUNITY

## 2019-01-30 RX ORDER — FLURBIPROFEN SODIUM 0.3 MG/ML
SOLUTION/ DROPS OPHTHALMIC
Refills: 3 | COMMUNITY
Start: 2019-01-23

## 2019-01-30 RX ORDER — SULFAMETHOXAZOLE AND TRIMETHOPRIM 800; 160 MG/1; MG/1
1 TABLET ORAL 2 TIMES DAILY
COMMUNITY
Start: 2019-01-23 | End: 2019-02-03

## 2019-01-30 RX ORDER — CEFAZOLIN SODIUM IN 0.9 % NACL 3 G/100 ML
3 INTRAVENOUS SOLUTION, PIGGYBACK (ML) INTRAVENOUS ONCE
Status: CANCELLED | OUTPATIENT
Start: 2019-01-31 | End: 2019-01-30

## 2019-01-30 NOTE — PATIENT INSTRUCTIONS
BMI for Adults  Body mass index (BMI) is a number that is calculated from a person's weight and height. In most adults, the number is used to find how much of an adult's weight is made up of fat. BMI is not as accurate as a direct measure of body fat.  How is BMI calculated?  BMI is calculated by dividing weight in kilograms by height in meters squared. It can also be calculated by dividing weight in pounds by height in inches squared, then multiplying the resulting number by 703. Charts are available to help you find your BMI quickly and easily without doing this calculation.  How is BMI interpreted?  Health care professionals use BMI charts to identify whether an adult is underweight, at a normal weight, or overweight based on the following guidelines:  · Underweight: BMI less than 18.5.  · Normal weight: BMI between 18.5 and 24.9.  · Overweight: BMI between 25 and 29.9.  · Obese: BMI of 30 and above.    BMI is usually interpreted the same for males and females.  Weight includes both fat and muscle, so someone with a muscular build, such as an athlete, may have a BMI that is higher than 24.9. In cases like these, BMI may not accurately depict body fat. To determine if excess body fat is the cause of a BMI of 25 or higher, further assessments may need to be done by a health care provider.  Why is BMI a useful tool?  BMI is used to identify a possible weight problem that may be related to a medical problem or may increase the risk for medical problems. BMI can also be used to promote changes to reach a healthy weight.  This information is not intended to replace advice given to you by your health care provider. Make sure you discuss any questions you have with your health care provider.  Document Released: 08/29/2005 Document Revised: 04/27/2017 Document Reviewed: 05/15/2015  [employer] Interactive Patient Education © 2018 [employer]  What You Need to Know About Smokeless Tobacco Use  Tobacco use is one of the  leading causes of cancer and other chronic health problems. Smokeless tobacco is tobacco that is put directly into the mouth instead of being smoked. It may also be called chewing tobacco or snuff. Smokeless tobacco is made from the leaves of tobacco plants and it comes in several forms:  · Loose, dry leaves, plugs, or twists.  · Moist pouches.  · Dissolving lozenges or strips.    Chewing, sucking, or holding the tobacco in your mouth causes your mouth to make more saliva. The saliva mixes with the tobacco to make “tobacco juice” that is swallowed or spit out.  How can smokeless tobacco affect me?  Using smokeless tobacco:  · Increases your risk of developing cancer. Smokeless tobacco contains at least 28 different types of cancer-causing chemicals (carcinogens).  · Increases your chances of developing other long-term health problems, including high blood pressure, heart disease, stroke, and dental problems.  · Can make you become addicted. Nicotine is one of the chemicals in tobacco. When you chew tobacco, you absorb nicotine from the tobacco juice. This can make you feel more alert than usual.  · Can cause problems with pregnancy. Pregnant women who use smokeless tobacco are more likely to miscarry or deliver a baby too early (premature delivery).  · Can affect the appearance and health of your mouth. Using smokeless tobacco may cause bad breath, yellow-brown teeth, mouth sores, cracking and bleeding lips, gum recession, and lesions on the soft tissues of your mouth (leukoplakia).    What are the benefits of not using smokeless tobacco?  The benefits of not using smokeless tobacco include:  · A healthy mind because:  ? You avoid addiction.  · A healthy body because:  ? You avoid dental problems.  ? You promote healthy pregnancy.  ? You avoid long-term health problems.  · A healthy wallet because:  ? You avoid costs of buying tobacco.  ? You avoid health care costs in the future.  · A healthy family because:  ? You  avoid accidental poisoning of children in your household.    What can happen if I continue to use smokeless tobacco?  If you continue to use smokeless tobacco, you will increase your risk for developing certain cancers. These include:  · Tongue.  · Lips, mouth, and gums.  · Throat (esophagus) and voice box (larynx).  · Stomach.  · Pancreas.  · Bladder.  · Colon.    Long-term use of smokeless tobacco can also lead to:  · High blood pressure, heart disease, and stroke.  · Gum disease, gum recession, and bone loss around the teeth.  · Tooth decay.    How do I quit using smokeless tobacco?  Quitting the use of smokeless tobacco can be hard, but it can be done. Follow these steps:  · Pick a date to quit. Set a date within the next two weeks. This gives you time to prepare.  · Write down the reasons why you are quitting. Keep this list in places where you will see it often, such as on your bathroom mirror or in your car or wallet.  · Identify the people, places, things, and activities that make you want to use tobacco (triggers) and avoid them.  · Get rid of any tobacco you have and remove any tobacco smells. To do this:  ? Throw away all containers of tobacco at home, at work, and in your car.  ? Throw away any other items that you use regularly when you chew tobacco.  ? Clean your car and make sure to remove all tobacco-related items.  ? Clean your home, including curtains and carpets.  · Tell your family, friends, and coworkers that you are quitting. This can make quitting easier.  · Ask your health care provider for help quitting smokeless tobacco. This may involve treatment. Find out what treatment options are covered by your health insurance.  · Keep track of how many days have passed since you quit. Remembering how long and hard you have worked to quit can help you avoid using tobacco again.    Where can I get support?  Ask your health care provider if there is a local support group for quitting smokeless  tobacco.  Where can I get more information?  You can learn more about the risks of using smokeless tobacco and the benefits of quitting from these sources:  · National Cancer [place]: www.cancer.gov  · American Cancer Society: www.cancer.org    When should I seek medical care?  Seek medical care if you have:  · White or other discolored patches in your mouth.  · Difficulty swallowing.  · A change in your voice.  · Unexplained weight loss.  · Stomach pain, nausea, or vomiting.    Summary  · Smokeless tobacco contains at least 28 different chemicals that are known to cause cancer (carcinogen).  · Nicotine is an addictive chemical in smokeless tobacco.  · When you quit using smokeless tobacco, you lower your risk of developing cancer.  This information is not intended to replace advice given to you by your health care provider. Make sure you discuss any questions you have with your health care provider.  Document Released: 05/21/2012 Document Revised: 08/12/2017 Document Reviewed: 07/29/2016  [employer] Interactive Patient Education © 2018 [employer]     Patient is a 89y old  Female who presents with a chief complaint of fever (27 Apr 2017 23:45)      Subjective: Patient seen and examined at bedside. NAD.    PAIN:0  	  DYSPNEA:n  NAUS/VOM: n  SECRETIONS: n  AGITATION: n    OTHER REVIEW OF SYSTEMS:  UNABLE TO OBTAIN  due to: delirium    T(C): 37.9, Max: 37.9 (04-30 @ 12:38)  HR: 70 (70 - 96)  BP: 182/75 (98/65 - 182/75)  RR: 16 (16 - 17)  SpO2: 94% (92% - 94%)  Wt(kg): --  GENERAL: NAD  HEENT:  NC/AT EOMI PERRL  NECK: supple no JVD  CVS:  +S1 S2 RRR  RESP: CTA B/L  GI:  loops of bowel in colostomy  : no suprapubic tenderness  MUSC:  no lower extremity edema  NEURO: confused  PSYCH:  Alert, Calm, Cooperative   SKIN:  Warm, moist, no rashes   LYMPH: normal     MEDS REVIEWED	          OPIATE NAÏVE (Y/N)    No Known Allergies      LABS REVIEWED:                        9.3    6.8   )-----------( 90       ( 30 Apr 2017 06:25 )             29.8     04-30    146<H>  |  116<H>  |  19  ----------------------------<  109<H>  3.3<L>   |  18<L>  |  1.50<H>    Ca    7.8<L>      30 Apr 2017 06:25  Phos  1.6     04-30  Mg     1.9     04-30    TPro  5.9<L>  /  Alb  2.2<L>  /  TBili  0.4  /  DBili  x   /  AST  15  /  ALT  13  /  AlkPhos  62  04-30      IMAGING REVIEWED    ADVANCED DIRECTIVES:       DNR     DNI     LIVING WILL     MOLST    PSYCHOSOCIAL-SPIRITUAL ASSESSMENT:    ___Reviewed     ___Care  plan unchanged     __x_Care plan adjusted as below    GOALS OF CARE DISCUSSION  	_x__Palliative care info/counseling provided	    ___Family meeting  	_x__Advanced Directives addressed	    __x_See previous Palliative Medicine Note    AGENCY CHOICE DISCUSSED:   _x__HOSPICE   ___CALVARY  ___OTHER:

## 2019-01-30 NOTE — PROGRESS NOTES
Subjective   Peter Abbott is a 38 y.o. male     History of Present Illness referred for evaluation treatment of a left groin soft tissue abscess.  Patient and his wife describes initially as about a dime size erythematous area to gotten progressively larger over the past 2 weeks.  Spontaneously drained for 2 days 3 days ago and now is stop draining.   He presented to his primary care provider who treated him with antibiotics.  He's had multiple soft tissue infections in the past to include one on his back which had have surgery and was treated with packing and wound care.  He is also had one on his right calf posteriorly which was treated which is clindamycin.  Patient is diabetic and patient is morbidly obese.  Patient had a CT scan at [facility] which is not completely evaluate this.  It appears to be in the subcutaneous tissue and cuts were not taken down far enough to demonstrate the full extent of this area of likely infection.  It does appear to be in the subcutaneous tissue however.  Patient also has an asymptomatic umbilical hernia which is not related to any of his symptoms.        Review of Systems   Constitutional: Negative.    HENT: Negative.    Eyes: Negative.    Respiratory: Positive for cough.    Gastrointestinal: Positive for abdominal pain.        Lt.Lower Quadrant   Endocrine: Negative.    Genitourinary: Negative.    Musculoskeletal: Negative.    Skin: Negative.    Allergic/Immunologic: Negative.    Neurological: Negative.    Hematological: Negative.    Psychiatric/Behavioral: Negative.      Past Medical History:   Diagnosis Date   • Arthropathy of lumbar facet joint    • Chronic pain disorder    • Long term drug therapy    • Low back pain    • Morbid obesity (CMS/HCC)    • Tobacco user    • Umbilical hernia      History reviewed. No pertinent surgical history.  Family History   Problem Relation Age of Onset   • Cancer Mother    • Cancer Sister      Social History     Socioeconomic History   •  Marital status: Single     Spouse name: Not on file   • Number of children: Not on file   • Years of education: Not on file   • Highest education level: Not on file   Social Needs   • Financial resource strain: Not on file   • Food insecurity - worry: Not on file   • Food insecurity - inability: Not on file   • Transportation needs - medical: Not on file   • Transportation needs - non-medical: Not on file   Occupational History   • Not on file   Tobacco Use   • Smoking status: Former Smoker   • Smokeless tobacco: Current User     Types: Chew   Substance and Sexual Activity   • Alcohol use: Yes     Comment: occasionally   • Drug use: No   • Sexual activity: Defer   Other Topics Concern   • Not on file   Social History Narrative   • Not on file     No Known Allergies    Home Medications:  Prior to Admission medications    Medication Sig Start Date End Date Taking? Authorizing Provider   B-D UF III MINI PEN NEEDLES 31G X 5 MM misc TO USE AT BEDTIME WITH LANTUS 1/23/19  Yes ProviderAlma MD   clobetasol (TEMOVATE) 0.05 % cream  1/10/19  Yes ProviderAlma MD   cyclobenzaprine (FLEXERIL) 10 MG tablet Take 10 mg by mouth. 1/23/19  Yes ProviderAlma MD   furosemide (LASIX) 40 MG tablet Take 40 mg by mouth. 7/26/16  Yes ProviderAlma MD   glyBURIDE (DIAbeta) 5 MG tablet Take 5 mg by mouth. 2/8/17  Yes ProviderAlma MD   HYDROcodone-acetaminophen (NORCO) 7.5-325 MG per tablet  1/7/17  Yes Alma Darby MD   lisinopril (PRINIVIL,ZESTRIL) 20 MG tablet Take 20 mg by mouth. 1/30/17  Yes ProviderAlma MD   metFORMIN (GLUCOPHAGE) 1000 MG tablet 1,000 mg. 12/31/16  Yes ProviderAlma MD   sulfamethoxazole-trimethoprim (BACTRIM DS,SEPTRA DS) 800-160 MG per tablet Take 1 tablet by mouth. 1/23/19 2/3/19 Yes Alma Darby MD   cyclobenzaprine (FLEXERIL) 10 MG tablet Take 10 mg by mouth 3 (three) times a day.  1/30/19  Alma Darby MD   cyclobenzaprine  (FLEXERIL) 10 MG tablet Take 10 mg by mouth 3 (Three) Times a Day. 7/26/16 1/30/19  Alma Darby MD   cyclobenzaprine (FLEXERIL) 10 MG tablet Take 10 mg by mouth 3 (Three) Times a Day. 7/26/16 1/30/19  Alma Darby MD   diclofenac (VOLTAREN) 75 MG EC tablet Take 75 mg by mouth daily as needed.  1/30/19  Alma Darby MD   EASY TOUCH LANCETS 30G/TWIST misc  12/31/16 1/30/19  Alma Darby MD   FREESTYLE LITE test strip  12/31/16 1/30/19  Alma Darby MD   furosemide (LASIX) 40 MG tablet Take 40 mg by mouth. 7/26/16 1/30/19  Alma Darby MD   furosemide (LASIX) 40 MG tablet Take 40 mg by mouth. 7/26/16 1/30/19  Alma Darby MD   gabapentin (NEURONTIN) 600 MG tablet Take 600 mg by mouth. 8/2/17 1/30/19  Alma Darby MD   glucose blood test strip Test tid 4/26/16 1/30/19  Alma Darby MD   glucose blood test strip To use qhs qi lantus 1/13/16 1/30/19  Alma Darby MD   glucose blood test strip Test tid 4/26/16 1/30/19  Alma Darby MD   glyBURIDE (DIABETA) 5 MG tablet Take 5 mg by mouth daily.  1/30/19  Alma Darby MD   glyBURIDE (DIAbeta) 5 MG tablet Take 5 mg by mouth. 1/13/16 1/30/19  Alma Darby MD   HYDROcodone-acetaminophen (NORCO) 7.5-325 MG per tablet  1/7/17 1/30/19  Alma Darby MD   Insulin Glargine (LANTUS SOLOSTAR) 100 UNIT/ML injection pen Inject 10 Units under the skin. 7/26/16 1/30/19  Alma Darby MD   Insulin Glargine (LANTUS SOLOSTAR) 100 UNIT/ML injection pen Inject 10 Units under the skin. 7/26/16 1/30/19  Alma Darby MD   Lancets (FREESTYLE) lancets 1 Device. 4/26/16 1/30/19  Provider, Historical, MD   Lancets misc 1 Device. 4/26/16 1/30/19  Alma Darby MD   lisinopril (PRINIVIL,ZESTRIL) 20 MG tablet Take 20 mg by mouth daily.  1/30/19  Alma Darby MD   lisinopril (PRINIVIL,ZESTRIL) 20 MG tablet Take 20 mg by mouth. 1/30/17 1/30/19   Provider, MD Alma   metFORMIN (GLUCOPHAGE) 850 MG tablet Take 850 mg by mouth 2 (two) times a day.  1/30/19  Alma Darby MD   morphine (MS CONTIN) 15 MG 12 hr tablet Take 15 mg by mouth every 12 (twelve) hours.  1/30/19  Alma Darby MD   SITagliptin (JANUVIA) 100 MG tablet Take 100 mg by mouth. 12/8/16 1/30/19  Alma Darby MD   SITagliptin (JANUVIA) 100 MG tablet Take 100 mg by mouth. 12/8/16 1/30/19  Alma Darby MD       Objective   Physical Exam   Constitutional: He is oriented to person, place, and time. He appears well-developed and well-nourished. No distress.   HENT:   Head: Normocephalic and atraumatic.   Nose: Nose normal.   Eyes: Conjunctivae are normal.   Neck: Normal range of motion. No tracheal deviation present. No thyromegaly present.   Cardiovascular: Normal rate, regular rhythm and normal heart sounds.   No murmur heard.  Pulmonary/Chest: Effort normal and breath sounds normal. No respiratory distress. He has no wheezes. He has no rales. He exhibits no tenderness.   Abdominal: Soft. He exhibits no distension. There is no tenderness. There is no rebound and no guarding. No hernia.   Musculoskeletal: He exhibits no tenderness or deformity.   Neurological: He is alert and oriented to person, place, and time.   Skin: Skin is warm and dry. No rash noted.   Psychiatric: He has a normal mood and affect. His behavior is normal. Judgment and thought content normal.   Vitals reviewed.   morbidly obese  Induration 5 x 8 cm left groin without any redness or drainage  Umbilical hernia unable to reduce      Assessment/Plan superficial soft tissue infection left groin would recommend I&D.  Recommend doing this tomorrow in the operating room.  I fully discussed this procedure alternatives risk benefits with the patient and he clearly understands.  We discussed how long it would take for his wound healing along the be out of work and were not able to determine that at  this time.  He understands all that will be managed.  He understands the likely will have an open wound postoperatively and will need packing and dressing care which his wife seems to be comfortable with as he's had that done on the other wounds specifically his back.  He is also aware that he has an  umbilical hernia but he's asymptomatic from that and that's not related to this current problem directly.  He understands that his body habitus and has history of diabetes are likely related to his recurring skin and soft tissue infections.      The encounter diagnosis was Soft tissue infection.                     This document has been electronically signed by Artru Ramírez MD on January 30, 2019 2:02 PM

## 2019-01-31 ENCOUNTER — APPOINTMENT (OUTPATIENT)
Dept: GENERAL RADIOLOGY | Facility: HOSPITAL | Age: 39
End: 2019-01-31

## 2019-01-31 ENCOUNTER — ANESTHESIA (OUTPATIENT)
Dept: PERIOP | Facility: HOSPITAL | Age: 39
End: 2019-01-31

## 2019-01-31 ENCOUNTER — HOSPITAL ENCOUNTER (OUTPATIENT)
Facility: HOSPITAL | Age: 39
Setting detail: HOSPITAL OUTPATIENT SURGERY
Discharge: HOME | End: 2019-01-31
Attending: SURGERY | Admitting: SURGERY

## 2019-01-31 VITALS
BODY MASS INDEX: 46.65 KG/M2 | HEIGHT: 69 IN | DIASTOLIC BLOOD PRESSURE: 56 MMHG | WEIGHT: 315 LBS | TEMPERATURE: 96.7 F | HEART RATE: 111 BPM | RESPIRATION RATE: 22 BRPM | OXYGEN SATURATION: 96 % | SYSTOLIC BLOOD PRESSURE: 117 MMHG

## 2019-01-31 DIAGNOSIS — L08.9 SOFT TISSUE INFECTION: ICD-10-CM

## 2019-01-31 LAB
GLUCOSE BLDC GLUCOMTR-MCNC: 245 MG/DL (ref 70–130)
GLUCOSE BLDC GLUCOMTR-MCNC: 276 MG/DL (ref 70–130)

## 2019-01-31 PROCEDURE — 87077 CULTURE AEROBIC IDENTIFY: CPT | Performed by: SURGERY

## 2019-01-31 PROCEDURE — 71045 X-RAY EXAM CHEST 1 VIEW: CPT

## 2019-01-31 PROCEDURE — 25010000002 DEXAMETHASONE PER 1 MG: Performed by: NURSE ANESTHETIST, CERTIFIED REGISTERED

## 2019-01-31 PROCEDURE — 25010000002 NEOSTIGMINE 4 MG/4ML SOLUTION PREFILLED SYRINGE: Performed by: NURSE ANESTHETIST, CERTIFIED REGISTERED

## 2019-01-31 PROCEDURE — 87176 TISSUE HOMOGENIZATION CULTR: CPT | Performed by: SURGERY

## 2019-01-31 PROCEDURE — 25010000002 SUCCINYLCHOLINE PER 20 MG: Performed by: NURSE ANESTHETIST, CERTIFIED REGISTERED

## 2019-01-31 PROCEDURE — 25010000002 FENTANYL CITRATE (PF) 100 MCG/2ML SOLUTION: Performed by: NURSE ANESTHETIST, CERTIFIED REGISTERED

## 2019-01-31 PROCEDURE — 87186 SC STD MICRODIL/AGAR DIL: CPT | Performed by: SURGERY

## 2019-01-31 PROCEDURE — 82962 GLUCOSE BLOOD TEST: CPT

## 2019-01-31 PROCEDURE — 25010000002 PROPOFOL 10 MG/ML EMULSION: Performed by: NURSE ANESTHETIST, CERTIFIED REGISTERED

## 2019-01-31 PROCEDURE — 25010000002 ONDANSETRON PER 1 MG: Performed by: NURSE ANESTHETIST, CERTIFIED REGISTERED

## 2019-01-31 PROCEDURE — 87070 CULTURE OTHR SPECIMN AEROBIC: CPT | Performed by: SURGERY

## 2019-01-31 PROCEDURE — 25010000002 MIDAZOLAM PER 1 MG: Performed by: NURSE ANESTHETIST, CERTIFIED REGISTERED

## 2019-01-31 PROCEDURE — 87205 SMEAR GRAM STAIN: CPT | Performed by: SURGERY

## 2019-01-31 PROCEDURE — 11042 DBRDMT SUBQ TIS 1ST 20SQCM/<: CPT | Performed by: SURGERY

## 2019-01-31 PROCEDURE — 87147 CULTURE TYPE IMMUNOLOGIC: CPT | Performed by: SURGERY

## 2019-01-31 PROCEDURE — 87015 SPECIMEN INFECT AGNT CONCNTJ: CPT | Performed by: SURGERY

## 2019-01-31 RX ORDER — SODIUM CHLORIDE 9 MG/ML
1000 INJECTION, SOLUTION INTRAVENOUS CONTINUOUS
Status: DISCONTINUED | OUTPATIENT
Start: 2019-01-31 | End: 2019-01-31 | Stop reason: HOSPADM

## 2019-01-31 RX ORDER — ONDANSETRON 2 MG/ML
INJECTION INTRAMUSCULAR; INTRAVENOUS AS NEEDED
Status: DISCONTINUED | OUTPATIENT
Start: 2019-01-31 | End: 2019-01-31 | Stop reason: SURG

## 2019-01-31 RX ORDER — ACETAMINOPHEN 325 MG/1
650 TABLET ORAL ONCE AS NEEDED
Status: DISCONTINUED | OUTPATIENT
Start: 2019-01-31 | End: 2019-01-31 | Stop reason: HOSPADM

## 2019-01-31 RX ORDER — CEFAZOLIN SODIUM IN 0.9 % NACL 3 G/100 ML
3 INTRAVENOUS SOLUTION, PIGGYBACK (ML) INTRAVENOUS ONCE
Status: COMPLETED | OUTPATIENT
Start: 2019-01-31 | End: 2019-01-31

## 2019-01-31 RX ORDER — NALOXONE HCL 0.4 MG/ML
0.2 VIAL (ML) INJECTION AS NEEDED
Status: DISCONTINUED | OUTPATIENT
Start: 2019-01-31 | End: 2019-01-31 | Stop reason: HOSPADM

## 2019-01-31 RX ORDER — NEOSTIGMINE METHYLSULFATE 4 MG/4 ML
SYRINGE (ML) INTRAVENOUS AS NEEDED
Status: DISCONTINUED | OUTPATIENT
Start: 2019-01-31 | End: 2019-01-31 | Stop reason: SURG

## 2019-01-31 RX ORDER — SUCCINYLCHOLINE CHLORIDE 20 MG/ML
INJECTION INTRAMUSCULAR; INTRAVENOUS AS NEEDED
Status: DISCONTINUED | OUTPATIENT
Start: 2019-01-31 | End: 2019-01-31 | Stop reason: SURG

## 2019-01-31 RX ORDER — LIDOCAINE HYDROCHLORIDE 10 MG/ML
INJECTION, SOLUTION INFILTRATION; PERINEURAL AS NEEDED
Status: DISCONTINUED | OUTPATIENT
Start: 2019-01-31 | End: 2019-01-31 | Stop reason: SURG

## 2019-01-31 RX ORDER — LABETALOL HYDROCHLORIDE 5 MG/ML
5 INJECTION, SOLUTION INTRAVENOUS
Status: DISCONTINUED | OUTPATIENT
Start: 2019-01-31 | End: 2019-01-31 | Stop reason: HOSPADM

## 2019-01-31 RX ORDER — FENTANYL CITRATE 50 UG/ML
INJECTION, SOLUTION INTRAMUSCULAR; INTRAVENOUS AS NEEDED
Status: DISCONTINUED | OUTPATIENT
Start: 2019-01-31 | End: 2019-01-31 | Stop reason: SURG

## 2019-01-31 RX ORDER — FLUMAZENIL 0.1 MG/ML
0.2 INJECTION INTRAVENOUS AS NEEDED
Status: DISCONTINUED | OUTPATIENT
Start: 2019-01-31 | End: 2019-01-31 | Stop reason: HOSPADM

## 2019-01-31 RX ORDER — ONDANSETRON 2 MG/ML
4 INJECTION INTRAMUSCULAR; INTRAVENOUS ONCE AS NEEDED
Status: DISCONTINUED | OUTPATIENT
Start: 2019-01-31 | End: 2019-01-31 | Stop reason: HOSPADM

## 2019-01-31 RX ORDER — EPHEDRINE SULFATE 50 MG/ML
5 INJECTION, SOLUTION INTRAVENOUS ONCE AS NEEDED
Status: DISCONTINUED | OUTPATIENT
Start: 2019-01-31 | End: 2019-01-31 | Stop reason: HOSPADM

## 2019-01-31 RX ORDER — HYDROCODONE BITARTRATE AND ACETAMINOPHEN 7.5; 325 MG/1; MG/1
1 TABLET ORAL EVERY 6 HOURS PRN
Qty: 20 TABLET | Refills: 0 | Status: SHIPPED | OUTPATIENT
Start: 2019-01-31

## 2019-01-31 RX ORDER — MIDAZOLAM HYDROCHLORIDE 1 MG/ML
INJECTION INTRAMUSCULAR; INTRAVENOUS AS NEEDED
Status: DISCONTINUED | OUTPATIENT
Start: 2019-01-31 | End: 2019-01-31 | Stop reason: SURG

## 2019-01-31 RX ORDER — ACETAMINOPHEN 650 MG/1
650 SUPPOSITORY RECTAL ONCE AS NEEDED
Status: DISCONTINUED | OUTPATIENT
Start: 2019-01-31 | End: 2019-01-31 | Stop reason: HOSPADM

## 2019-01-31 RX ORDER — DEXAMETHASONE SODIUM PHOSPHATE 4 MG/ML
INJECTION, SOLUTION INTRA-ARTICULAR; INTRALESIONAL; INTRAMUSCULAR; INTRAVENOUS; SOFT TISSUE AS NEEDED
Status: DISCONTINUED | OUTPATIENT
Start: 2019-01-31 | End: 2019-01-31 | Stop reason: SURG

## 2019-01-31 RX ORDER — ROCURONIUM BROMIDE 10 MG/ML
INJECTION, SOLUTION INTRAVENOUS AS NEEDED
Status: DISCONTINUED | OUTPATIENT
Start: 2019-01-31 | End: 2019-01-31 | Stop reason: SURG

## 2019-01-31 RX ORDER — ALBUTEROL SULFATE 2.5 MG/3ML
2.5 SOLUTION RESPIRATORY (INHALATION) ONCE
Status: COMPLETED | OUTPATIENT
Start: 2019-01-31 | End: 2019-01-31

## 2019-01-31 RX ORDER — GLYCOPYRROLATE 0.2 MG/ML
INJECTION INTRAMUSCULAR; INTRAVENOUS AS NEEDED
Status: DISCONTINUED | OUTPATIENT
Start: 2019-01-31 | End: 2019-01-31 | Stop reason: SURG

## 2019-01-31 RX ORDER — DIPHENHYDRAMINE HYDROCHLORIDE 50 MG/ML
12.5 INJECTION INTRAMUSCULAR; INTRAVENOUS
Status: DISCONTINUED | OUTPATIENT
Start: 2019-01-31 | End: 2019-01-31 | Stop reason: HOSPADM

## 2019-01-31 RX ORDER — PROPOFOL 10 MG/ML
VIAL (ML) INTRAVENOUS AS NEEDED
Status: DISCONTINUED | OUTPATIENT
Start: 2019-01-31 | End: 2019-01-31 | Stop reason: SURG

## 2019-01-31 RX ADMIN — ALBUTEROL SULFATE 2.5 MG: 2.5 SOLUTION RESPIRATORY (INHALATION) at 10:27

## 2019-01-31 RX ADMIN — LIDOCAINE HYDROCHLORIDE 100 MG: 10 INJECTION, SOLUTION INFILTRATION; PERINEURAL at 09:42

## 2019-01-31 RX ADMIN — GLYCOPYRROLATE 0.2 MG: 0.2 INJECTION, SOLUTION INTRAMUSCULAR; INTRAVENOUS at 09:34

## 2019-01-31 RX ADMIN — MIDAZOLAM HYDROCHLORIDE 2 MG: 2 INJECTION, SOLUTION INTRAMUSCULAR; INTRAVENOUS at 08:52

## 2019-01-31 RX ADMIN — LIDOCAINE HYDROCHLORIDE 50 MG: 10 INJECTION, SOLUTION INFILTRATION; PERINEURAL at 09:06

## 2019-01-31 RX ADMIN — ONDANSETRON 4 MG: 2 INJECTION INTRAMUSCULAR; INTRAVENOUS at 09:28

## 2019-01-31 RX ADMIN — DEXAMETHASONE SODIUM PHOSPHATE 4 MG: 4 INJECTION, SOLUTION INTRAMUSCULAR; INTRAVENOUS at 09:08

## 2019-01-31 RX ADMIN — Medication 2 MG: at 09:34

## 2019-01-31 RX ADMIN — PROPOFOL 40 MG: 10 INJECTION, EMULSION INTRAVENOUS at 09:14

## 2019-01-31 RX ADMIN — CEFAZOLIN 3 G: 1 INJECTION, POWDER, FOR SOLUTION INTRAMUSCULAR; INTRAVENOUS; PARENTERAL at 09:05

## 2019-01-31 RX ADMIN — FENTANYL CITRATE 100 MCG: 50 INJECTION, SOLUTION INTRAMUSCULAR; INTRAVENOUS at 09:02

## 2019-01-31 RX ADMIN — SODIUM CHLORIDE 1000 ML: 9 INJECTION, SOLUTION INTRAVENOUS at 08:37

## 2019-01-31 RX ADMIN — SUCCINYLCHOLINE CHLORIDE 180 MG: 20 INJECTION, SOLUTION INTRAMUSCULAR; INTRAVENOUS at 09:06

## 2019-01-31 RX ADMIN — PROPOFOL 200 MG: 10 INJECTION, EMULSION INTRAVENOUS at 09:06

## 2019-01-31 RX ADMIN — ROCURONIUM BROMIDE 20 MG: 10 INJECTION INTRAVENOUS at 09:15

## 2019-01-31 NOTE — ANESTHESIA PROCEDURE NOTES
Airway  Urgency: elective    Airway not difficult    General Information and Staff    Patient location during procedure: OR  CRNA: Lea Tristan CRNA    Indications and Patient Condition  Indications for airway management: airway protection    Preoxygenated: yes  MILS not maintained throughout  Mask difficulty assessment: 0 - not attempted    Final Airway Details  Final airway type: endotracheal airway      Successful airway: ETT  Cuffed: yes   Successful intubation technique: video laryngoscopy and RSI  Facilitating devices/methods: intubating stylet  Endotracheal tube insertion site: oral  Blade: Robbie  Blade size: 4  ETT size (mm): 7.5  Cormack-Lehane Classification: grade I - full view of glottis  Placement verified by: chest auscultation and capnometry   Cuff volume (mL): 10  Measured from: teeth  ETT to teeth (cm): 22  Number of attempts at approach: 1

## 2019-01-31 NOTE — ANESTHESIA POSTPROCEDURE EVALUATION
Patient: Peter Abbott    Procedure Summary     Date:  01/31/19 Room / Location:  [facility] OR [address] [place] [address] OR    Anesthesia Start:  0857 Anesthesia Stop:  0955    Procedure:  INCISE AND DEBRIDEMENT ABSCESS LEFT GROIN (Left ) Diagnosis:       Soft tissue infection      (Soft tissue infection [L08.9])    Surgeon:  Artur Ramírez MD Provider:  Eamon Cifuentes MD    Anesthesia Type:  general ASA Status:  3          Anesthesia Type: general  Last vitals  BP   132/62 (01/31/19 0826)   Temp   96.9 °F (36.1 °C) (01/31/19 0826)   Pulse   112 (01/31/19 0826)   Resp   20 (01/31/19 0826)     SpO2   96 % (01/31/19 0826)     Post Anesthesia Care and Evaluation    Patient location during evaluation: PACU  Patient participation: complete - patient participated  Level of consciousness: awake and alert  Pain score: 2  Pain management: adequate  Airway patency: patent  Anesthetic complications: No anesthetic complications  PONV Status: none  Cardiovascular status: acceptable  Respiratory status: acceptable  Hydration status: acceptable

## 2019-01-31 NOTE — ANESTHESIA PREPROCEDURE EVALUATION
Anesthesia Evaluation     Patient summary reviewed   NPO Solid Status: > 8 hours  NPO Liquid Status: > 8 hours           Airway   Mallampati: II  Neck ROM: full  Large neck circumference and Possible difficult intubation  Dental    (+) poor dentition    Pulmonary    (+) COPD mild, sleep apnea on CPAP, decreased breath sounds,   Cardiovascular - normal exam  Exercise tolerance: poor (<4 METS)    NYHA Classification: III    (+) hypertension, GOMEZ, hyperlipidemia,       Neuro/Psych  (+) weakness, psychiatric history Anxiety,     GI/Hepatic/Renal/Endo    (+) obesity, morbid obesity, GERD poorly controlled,  diabetes mellitus type 1 using insulin,     Musculoskeletal     Abdominal   (+) obese,    Substance History      OB/GYN          Other   (+) arthritis                     Anesthesia Plan    ASA 3     general   Rapid sequence  intravenous induction   Anesthetic plan, all risks, benefits, and alternatives have been provided, discussed and informed consent has been obtained with: patient.

## 2019-02-01 ENCOUNTER — OFFICE VISIT (OUTPATIENT)
Dept: SURGERY | Facility: CLINIC | Age: 39
End: 2019-02-01

## 2019-02-01 VITALS
SYSTOLIC BLOOD PRESSURE: 118 MMHG | HEART RATE: 112 BPM | WEIGHT: 315 LBS | HEIGHT: 69 IN | BODY MASS INDEX: 46.65 KG/M2 | TEMPERATURE: 97.7 F | DIASTOLIC BLOOD PRESSURE: 78 MMHG

## 2019-02-01 DIAGNOSIS — L08.9 SOFT TISSUE INFECTION: Primary | ICD-10-CM

## 2019-02-01 PROCEDURE — 99212 OFFICE O/P EST SF 10 MIN: CPT | Performed by: SURGERY

## 2019-02-01 RX ORDER — AMITRIPTYLINE HYDROCHLORIDE 50 MG/1
50 TABLET, FILM COATED ORAL NIGHTLY
COMMUNITY

## 2019-02-01 NOTE — PATIENT INSTRUCTIONS

## 2019-02-01 NOTE — PROGRESS NOTES
38-year-old male one day status post I&D of left groin abscess presents now to have his first dressing change.  An I&D was done and counter incision was made with a drain placed cervical Penrose.  Packing was removed wound appears to be adequately indeed and there is some undermining where the drain and the counter incision addressed.  Went over dressing changes and packing with the patient and his wife.  Patient will follow up with us in the office in 1 week or sooner if he has any concerns or questions

## 2019-02-02 LAB
BACTERIA FLD CULT: ABNORMAL
BACTERIA SPEC AEROBE CULT: ABNORMAL
GRAM STN SPEC: ABNORMAL

## 2019-02-11 ENCOUNTER — OFFICE VISIT (OUTPATIENT)
Dept: SURGERY | Facility: CLINIC | Age: 39
End: 2019-02-11

## 2019-02-11 VITALS
BODY MASS INDEX: 46.65 KG/M2 | TEMPERATURE: 97 F | WEIGHT: 315 LBS | DIASTOLIC BLOOD PRESSURE: 70 MMHG | SYSTOLIC BLOOD PRESSURE: 134 MMHG | HEART RATE: 113 BPM | HEIGHT: 69 IN

## 2019-02-11 DIAGNOSIS — L08.9 SOFT TISSUE INFECTION: Primary | ICD-10-CM

## 2019-02-11 PROCEDURE — 99024 POSTOP FOLLOW-UP VISIT: CPT | Performed by: SURGERY

## 2019-02-11 RX ORDER — CLINDAMYCIN HYDROCHLORIDE 300 MG/1
300 CAPSULE ORAL 3 TIMES DAILY
COMMUNITY
Start: 2019-02-07 | End: 2019-02-18

## 2019-02-11 RX ORDER — CLOBETASOL PROPIONATE 0.5 MG/G
CREAM TOPICAL
COMMUNITY
Start: 2018-09-19

## 2019-02-11 RX ORDER — PREGABALIN 150 MG/1
150 CAPSULE ORAL
COMMUNITY
Start: 2018-08-14

## 2019-02-11 NOTE — PATIENT INSTRUCTIONS

## 2019-02-11 NOTE — PROGRESS NOTES
Patient is now over a week out from I&D of the left groin superficial abscess.  Overall he is feeling much better.  No complaints.  Patient did see his primary care provider and for some reason told him a different way to change his dressing and also started him on clindamycin.  Unsure why this was done.  I specifically went over the dressing changes with the patient and there is a reason why I recommended that he get in the shower and change the dressing as I had instructed him.  His wife was helping him with the dressing.  The other thing is the clindamycin is the one bacteria that the culture showed was resistant to.  Unless the patient is having obviously worsening infection I would not recommend he be treated with any antibiotic and clindamycin is not what I would recommend.     Wound is clean.  Swelling and tenderness have resolved.  Removed Penrose drain.        Patient is going to stop his clindamycin.  Is going to continue with his local wound care and will follow up with us in 1 week or sooner if he has any other concerns or questions.

## 2019-02-19 ENCOUNTER — OFFICE VISIT (OUTPATIENT)
Dept: SURGERY | Facility: CLINIC | Age: 39
End: 2019-02-19

## 2019-02-19 VITALS
HEART RATE: 116 BPM | HEIGHT: 69 IN | DIASTOLIC BLOOD PRESSURE: 74 MMHG | BODY MASS INDEX: 46.65 KG/M2 | TEMPERATURE: 97.5 F | SYSTOLIC BLOOD PRESSURE: 128 MMHG | WEIGHT: 315 LBS

## 2019-02-19 DIAGNOSIS — L08.9 SOFT TISSUE INFECTION: Primary | ICD-10-CM

## 2019-02-19 PROCEDURE — 99212 OFFICE O/P EST SF 10 MIN: CPT | Performed by: SURGERY

## 2019-02-19 RX ORDER — BLOOD-GLUCOSE METER
EACH MISCELLANEOUS
COMMUNITY
Start: 2018-02-13

## 2019-02-19 NOTE — PROGRESS NOTES
38-year-old male now nearly 3 weeks out from I&D of a left groin abscess.  Wound is much improved.  There is not anything the pack.  Prescription 2 small openings they can be addressed with just regular gauze dressing without attempting to pack.  There is no swelling no sternal tenderness no induration.  Patient is not currently on any antibiotics.  He is doing local wound care his wife is changing his dressings for him.  Patient can go back to work on the 24th as planned.  He has specific paperwork he wishes for us to fill out so he will bring the paperwork by the office.  Expect these 2 small areas to completely close up and the patient will follow up with us on a as needed basis

## 2019-02-19 NOTE — PATIENT INSTRUCTIONS

## 2021-10-20 ENCOUNTER — TRANSCRIBE ORDERS (OUTPATIENT)
Dept: ADMINISTRATIVE | Facility: HOSPITAL | Age: 41
End: 2021-10-20

## 2021-10-20 DIAGNOSIS — R09.89 DECREASED PULSE: Primary | ICD-10-CM

## 2023-03-30 ENCOUNTER — TRANSCRIBE ORDERS (OUTPATIENT)
Dept: CARDIOLOGY | Facility: CLINIC | Age: 43
End: 2023-03-30
Payer: COMMERCIAL

## 2023-03-30 DIAGNOSIS — L03.119 CELLULITIS OF LOWER EXTREMITY, UNSPECIFIED LATERALITY: Primary | ICD-10-CM

## (undated) DEVICE — PREP SOL POVIDONE/IODINE BT 4OZ

## (undated) DEVICE — GOWN,PREVENTION PLUS,XLNG/XXLARGE,STRL: Brand: MEDLINE

## (undated) DEVICE — GLV SURG SENSICARE POLYISPRN W/ALOE PF LF 6 GRN STRL

## (undated) DEVICE — BANDAGE,GAUZE,BULKEE II,4.5"X4.1YD,STRL: Brand: MEDLINE

## (undated) DEVICE — PK MAJ PROC LF 60

## (undated) DEVICE — PREP PVP-I 7.5P BT 4OZ

## (undated) DEVICE — SPNG GZ WOVN 4X4IN 12PLY 10/BX STRL

## (undated) DEVICE — PENROSE DRAIN 18 X .5" SILICONE: Brand: MEDLINE

## (undated) DEVICE — PAD,ABDOMINAL,8"X10",ST,LF: Brand: MEDLINE

## (undated) DEVICE — GLV SURG TRIUMPH LT PF LTX 7 STRL

## (undated) DEVICE — SOL IRR NACL 0.9PCT BT 1000ML

## (undated) DEVICE — GOWN,AURORA,NOREINF,RAGLAN,XL,STERILE: Brand: MEDLINE

## (undated) DEVICE — GLV SURG SENSICARE PI PF LF 7 GRN STRL

## (undated) DEVICE — STERILE POLYISOPRENE POWDER-FREE SURGICAL GLOVES: Brand: PROTEXIS

## (undated) DEVICE — STERILE POLYISOPRENE POWDER-FREE SURGICAL GLOVES WITH EMOLLIENT COATING: Brand: PROTEXIS

## (undated) DEVICE — GLV SURG TRIUMPH LT PF LTX 6.5 STRL

## (undated) DEVICE — TRAP,MUCUS SPECIMEN,40CC: Brand: MEDLINE

## (undated) DEVICE — SUT NLY 2/0 664G

## (undated) DEVICE — GLV SURG TRIUMPH LT PF LTX 7.5 STRL

## (undated) DEVICE — GLV SURG SENSICARE POLYISPRN W/ALOE PF LF 6.5 GRN STRL